# Patient Record
Sex: FEMALE | Race: WHITE | NOT HISPANIC OR LATINO | Employment: OTHER | URBAN - METROPOLITAN AREA
[De-identification: names, ages, dates, MRNs, and addresses within clinical notes are randomized per-mention and may not be internally consistent; named-entity substitution may affect disease eponyms.]

---

## 2018-05-03 ENCOUNTER — OFFICE VISIT (OUTPATIENT)
Dept: CARDIOLOGY CLINIC | Facility: CLINIC | Age: 73
End: 2018-05-03
Payer: MEDICARE

## 2018-05-03 VITALS
OXYGEN SATURATION: 97 % | SYSTOLIC BLOOD PRESSURE: 124 MMHG | WEIGHT: 188.4 LBS | HEIGHT: 66 IN | BODY MASS INDEX: 30.28 KG/M2 | DIASTOLIC BLOOD PRESSURE: 80 MMHG | HEART RATE: 76 BPM

## 2018-05-03 DIAGNOSIS — E78.2 MIXED HYPERLIPIDEMIA: ICD-10-CM

## 2018-05-03 DIAGNOSIS — I35.0 NONRHEUMATIC AORTIC VALVE STENOSIS: Primary | ICD-10-CM

## 2018-05-03 DIAGNOSIS — I10 ESSENTIAL HYPERTENSION: ICD-10-CM

## 2018-05-03 PROCEDURE — 99214 OFFICE O/P EST MOD 30 MIN: CPT | Performed by: INTERNAL MEDICINE

## 2018-05-03 RX ORDER — LEVOTHYROXINE SODIUM 125 UG/1
125 TABLET ORAL DAILY
COMMUNITY
Start: 2018-04-03

## 2018-05-03 RX ORDER — POTASSIUM CHLORIDE 750 MG/1
10 TABLET, FILM COATED, EXTENDED RELEASE ORAL DAILY
COMMUNITY
Start: 2018-04-03

## 2018-05-03 RX ORDER — HYDROCHLOROTHIAZIDE 25 MG/1
25 TABLET ORAL DAILY
COMMUNITY
Start: 2018-04-03 | End: 2020-03-17 | Stop reason: ALTCHOICE

## 2018-05-03 RX ORDER — ATORVASTATIN CALCIUM 40 MG/1
40 TABLET, FILM COATED ORAL DAILY
COMMUNITY
Start: 2018-04-03 | End: 2019-03-26 | Stop reason: DRUGHIGH

## 2018-05-03 NOTE — PROGRESS NOTES
Cardiology Follow Up    [facility]  1945  [id number]  [address] [facility]  [address] [place] [address] [address]    1  Nonrheumatic aortic valve stenosis     2  Mixed hyperlipidemia     3  Essential hypertension         Interval History:  53-year-old female with a known history of aortic stenosis  She states she is asymptomatic in tolerates all activity without limitation  She states she was able to walk multiple flight of steps without stopping she is able walk up up grades she takes care of her grandchildren and again she does says she does not get short of breath  She also denies chest pain orthopnea paroxysmal nocturnal dyspnea or syncope  Patient Active Problem List   Diagnosis    Nonrheumatic aortic valve stenosis    Mixed hyperlipidemia    Essential hypertension     Past Medical History:   Diagnosis Date    Hypothyroid      Social History     Social History    Marital status: /Civil Union     Spouse name: N/A    Number of children: N/A    Years of education: N/A     Occupational History    Not on file  Social History Main Topics    Smoking status: Former Smoker     Quit date: 2003    Smokeless tobacco: Former User    Alcohol use No    Drug use: No    Sexual activity: Not on file     Other Topics Concern    Not on file     Social History Narrative    No narrative on file      History reviewed  No pertinent family history  History reviewed  No pertinent surgical history  Current Outpatient Prescriptions:     atorvastatin (LIPITOR) 40 mg tablet, 40 mg daily  , Disp: , Rfl:     hydrochlorothiazide (HYDRODIURIL) 25 mg tablet, 25 mg daily  , Disp: , Rfl:     potassium chloride (K-DUR) 10 mEq tablet, 10 mEq daily  , Disp: , Rfl:     SYNTHROID 125 MCG tablet, 125 mcg daily  , Disp: , Rfl:   No Known Allergies    Labs:  No results found for any previous visit       Imaging: No results found  Review of Systems:  Review of Systems   Constitutional: Negative for fatigue  HENT: Negative for hearing loss  Eyes: Negative for discharge  Respiratory: Negative for shortness of breath  Cardiovascular: Negative for chest pain, palpitations and leg swelling  Gastrointestinal: Negative for abdominal pain  Endocrine: Negative for polyuria  Genitourinary: Negative for dysuria  Musculoskeletal: Negative for back pain and myalgias  Skin: Negative for rash  Neurological: Negative for syncope  Hematological: Does not bruise/bleed easily  Psychiatric/Behavioral: Negative for confusion and sleep disturbance  Physical Exam:  Physical Exam   Constitutional: She is oriented to person, place, and time  She appears well-developed and well-nourished  HENT:   Head: Normocephalic and atraumatic  Mouth/Throat: Oropharynx is clear and moist    Eyes: EOM are normal    Neck: Normal range of motion  Neck supple  No JVD present  Cardiovascular: Normal rate, regular rhythm and intact distal pulses  Exam reveals gallop and S4  Murmur heard  Systolic murmur is present with a grade of 3/6   Pulmonary/Chest: Effort normal and breath sounds normal    Abdominal: Soft  Bowel sounds are normal    Musculoskeletal: Normal range of motion  Neurological: She is alert and oriented to person, place, and time  She has normal reflexes  Skin: Skin is warm and dry  Psychiatric: She has a normal mood and affect  Her behavior is normal  Judgment and thought content normal    Nursing note and vitals reviewed  Discussion/Summary:  Amita Young is a history of aortic stenosis  She has a cardiologist in the [place] where she stays during the summer to watch her grandchildren  She lives in the [facility] in the winter  On 04/03/2018 she saw Dr Rocio Cotton in    At that time an echocardiogram was performed that showed moderate concentric left ventricular hypertrophy with critical aortic stenosis  Peak aortic valve velocity was 5 2 m/sec with a peak aortic gradient of 120 mm of mercury and a mean gradient of 80  This is consistent with an echo she had a few years back in [place]  I think with a velocity over 5 in the development of moderate left ventricular hypertrophy would recommend aortic valve replacement at this point even that she claims to be asymptomatic  Her calculated valve area 0 65 centimeter squared  I suspect if we did functional assessment she would have some moderate impairment in functional capacity  She asks if she is a candidate for tavr  From a surgical risk standpoint she is likely low to at most moderate  If she meets moderate criteria she would be a low to intermediate risk surgical candidate  Her preference is to have a tavr  I offered her an assessment at [facility] so that we can better assess her risk and also do some other testing for final recommendation regarding tab are versus surgical aortic valve replacement  She would like to get the procedure done in [place]  She states that she is going to follow up with a cardiologist in [place] when she is there this summer  If she decides not to use that system she will  notify us  Otherwise I will see her on an as needed basis

## 2018-08-03 DIAGNOSIS — J90 PLEURAL EFFUSION: Primary | ICD-10-CM

## 2018-09-20 ENCOUNTER — OFFICE VISIT (OUTPATIENT)
Dept: CARDIOLOGY CLINIC | Facility: CLINIC | Age: 73
End: 2018-09-20
Payer: MEDICARE

## 2018-09-20 VITALS
HEART RATE: 64 BPM | SYSTOLIC BLOOD PRESSURE: 140 MMHG | BODY MASS INDEX: 29.66 KG/M2 | HEIGHT: 65 IN | WEIGHT: 178 LBS | DIASTOLIC BLOOD PRESSURE: 85 MMHG

## 2018-09-20 DIAGNOSIS — I10 HTN (HYPERTENSION), BENIGN: Primary | ICD-10-CM

## 2018-09-20 DIAGNOSIS — E78.2 MIXED HYPERLIPIDEMIA: ICD-10-CM

## 2018-09-20 DIAGNOSIS — I10 ESSENTIAL HYPERTENSION: ICD-10-CM

## 2018-09-20 DIAGNOSIS — Z95.3 S/P AORTIC VALVE REPLACEMENT WITH PORCINE VALVE: ICD-10-CM

## 2018-09-20 PROBLEM — I35.0 NONRHEUMATIC AORTIC VALVE STENOSIS: Status: RESOLVED | Noted: 2018-05-03 | Resolved: 2018-09-20

## 2018-09-20 PROCEDURE — 99214 OFFICE O/P EST MOD 30 MIN: CPT | Performed by: INTERNAL MEDICINE

## 2018-09-20 RX ORDER — AMLODIPINE BESYLATE 5 MG/1
5 TABLET ORAL DAILY
Qty: 90 TABLET | Refills: 5 | Status: SHIPPED | OUTPATIENT
Start: 2018-09-20 | End: 2019-11-05 | Stop reason: SDUPTHER

## 2018-09-20 RX ORDER — ASPIRIN 81 MG/1
81 TABLET ORAL DAILY
COMMUNITY
End: 2020-03-17 | Stop reason: ALTCHOICE

## 2018-09-20 RX ORDER — AMLODIPINE BESYLATE 5 MG/1
5 TABLET ORAL DAILY
COMMUNITY
Start: 2018-08-28 | End: 2018-09-20 | Stop reason: SDUPTHER

## 2018-09-20 RX ORDER — METOPROLOL SUCCINATE 25 MG/1
25 TABLET, EXTENDED RELEASE ORAL DAILY
Qty: 90 TABLET | Refills: 5 | Status: SHIPPED | OUTPATIENT
Start: 2018-09-20 | End: 2019-11-05 | Stop reason: SDUPTHER

## 2018-09-20 RX ORDER — METOPROLOL SUCCINATE 25 MG/1
25 TABLET, EXTENDED RELEASE ORAL DAILY
COMMUNITY
Start: 2018-08-28 | End: 2018-09-20 | Stop reason: SDUPTHER

## 2018-09-20 NOTE — ASSESSMENT & PLAN NOTE
Discussed possibly discontinuing statin, as catheterization did not show blockages  Patient will discuss with PCP

## 2018-09-20 NOTE — PROGRESS NOTES
Patient ID: Marny Closs is a 72 y o  female  Plan:      Essential hypertension  BP adequately controlled today and by patient's home logs  Mixed hyperlipidemia  Discussed possibly discontinuing statin, as catheterization did not show blockages  Patient will discuss with PCP  S/P aortic valve replacement with porcine valve  Sounds good on exam  Will check baseline ECHO at next follow up visit in 6 months  Follow up Plan: Will check baseline ECHO at next follow-up visit in 6 months  No other changes  HPI: The patient presents to the office for routine followup regarding HTN and her aortic valve stenosis  Since we last saw her, she had a cardiac catheterization and porcine aortic valve replacement  This was performed with a mini thoracotomy at [facility]  No coronary disease was found  She currently offers no cardiac complaints  She denies chest pain, SOB, orthopnea, palpitations, syncope  Past Surgical History:   Procedure Laterality Date    AORTIC VALVE REPLACEMENT  06/29/2018    #23 Medtronic Mosaic porcine valve aortic valve at [address]  06/29/2018    CARDIAC CATHETERIZATION  06/28/2018    No significant CAD  Calcified aortic valve  CMP: No results found for: NA, K, CL, CO2, BUN, CREATININE, GLUCOSE, EGFR    Lipid Profile: No results found for: CHOL, TRIG, HDL, LDL      Review of Systems   10  point ROS  was otherwise non pertinent or negative except as per HPI or as below  Gait: Normal        Objective:     /85 (BP Location: Right arm, Patient Position: Sitting, Cuff Size: Adult)   Pulse 64   Ht 5' 5" (1 651 m)   Wt 80 7 kg (178 lb)   BMI 29 62 kg/m²     PHYSICAL EXAM:    General:  Normal appearance in no distress  Eyes:  Anicteric  Oral mucosa:  Moist   Neck:  No JVD  Carotid upstrokes are brisk without bruits  No masses  Chest:  Clear to auscultation and percussion    Right mini thoracotomy chest wound appears well healed  Cardiac:  Normal PMI  Normal S1 and S2  No murmur gallop or rub  Abdomen:  Soft and nontender  No palpable organomegaly or aortic enlargement  Extremities:  No peripheral edema  Musculoskeletal:  Symmetric  Vascular:  Femoral pulses are brisk without bruits  Popliteal pulses are intact bilaterally  Pedal pulses are intact  Neuro:  Grossly symmetric  Psych:  Alert and oriented x3  Current Outpatient Prescriptions:     amLODIPine (NORVASC) 5 mg tablet, Take 1 tablet (5 mg total) by mouth daily, Disp: 90 tablet, Rfl: 5    aspirin (ECOTRIN LOW STRENGTH) 81 mg EC tablet, Take 81 mg by mouth daily, Disp: , Rfl:     atorvastatin (LIPITOR) 40 mg tablet, 40 mg daily  , Disp: , Rfl:     hydrochlorothiazide (HYDRODIURIL) 25 mg tablet, 25 mg daily  , Disp: , Rfl:     metoprolol succinate (TOPROL-XL) 25 mg 24 hr tablet, Take 1 tablet (25 mg total) by mouth daily, Disp: 90 tablet, Rfl: 5    potassium chloride (K-DUR) 10 mEq tablet, 10 mEq daily  , Disp: , Rfl:     SYNTHROID 125 MCG tablet, 125 mcg daily  , Disp: , Rfl:   No Known Allergies  Past Medical History:   Diagnosis Date    History of echocardiogram 04/02/2018    EF 0 65 (65%), moderate-severe aortic stenosis  Trace-mild mitral regurgitation      Hyperlipidemia     Hypertension     Hypothyroid     Nonrheumatic aortic (valve) stenosis     s/p AVR on 6/29/2018           History   Smoking Status    Former Smoker    Types: Cigarettes    Quit date: 2003   Smokeless Tobacco    Former User

## 2019-01-08 ENCOUNTER — OFFICE VISIT (OUTPATIENT)
Dept: URGENT CARE | Facility: CLINIC | Age: 74
End: 2019-01-08
Payer: MEDICARE

## 2019-01-08 VITALS
OXYGEN SATURATION: 99 % | BODY MASS INDEX: 29.66 KG/M2 | HEIGHT: 65 IN | DIASTOLIC BLOOD PRESSURE: 68 MMHG | HEART RATE: 78 BPM | TEMPERATURE: 98.3 F | WEIGHT: 178 LBS | SYSTOLIC BLOOD PRESSURE: 120 MMHG | RESPIRATION RATE: 20 BRPM

## 2019-01-08 DIAGNOSIS — B96.89 ACUTE BACTERIAL BRONCHITIS: Primary | ICD-10-CM

## 2019-01-08 DIAGNOSIS — J20.8 ACUTE BACTERIAL BRONCHITIS: Primary | ICD-10-CM

## 2019-01-08 PROCEDURE — 99203 OFFICE O/P NEW LOW 30 MIN: CPT | Performed by: PHYSICIAN ASSISTANT

## 2019-01-08 PROCEDURE — G0463 HOSPITAL OUTPT CLINIC VISIT: HCPCS | Performed by: PHYSICIAN ASSISTANT

## 2019-01-08 RX ORDER — PREDNISONE 10 MG/1
TABLET ORAL
Qty: 26 TABLET | Refills: 0 | Status: SHIPPED | OUTPATIENT
Start: 2019-01-08 | End: 2020-03-17 | Stop reason: ALTCHOICE

## 2019-01-08 RX ORDER — CEFUROXIME AXETIL 500 MG/1
500 TABLET ORAL EVERY 12 HOURS SCHEDULED
Qty: 20 TABLET | Refills: 0 | Status: SHIPPED | OUTPATIENT
Start: 2019-01-08 | End: 2019-01-18

## 2019-01-08 NOTE — PROGRESS NOTES
[facility]    NAME: Norris Peres is a 72 y o  female  : 1945    MRN: [id number]  DATE: 2019  TIME: 1:00 PM    Assessment and Plan   Acute bacterial bronchitis [J20 8, B96 89]  1  Acute bacterial bronchitis  cefuroxime (CEFTIN) 500 mg tablet    predniSONE 10 mg tablet       Patient Instructions     Patient Instructions   I have prescribed an antibiotic for the infection  Please take the antibiotic as prescribed and finish the entire prescription  I recommend that the patient takes an over the counter probiotic or eats yogurt with live cultures in it [place]) to keep good bacteria in the gut and help prevent diarrhea  Wash hands frequently to prevent the spread of infection  Can use over the counter cough and cold medications to help with symptoms  Ibuprofen and/or tylenol as needed for pain or fever  If not improving over the next 7-10 days, follow up with PCP  Chief Complaint     Chief Complaint   Patient presents with    Cough     cough and sinus pressure for 1 week       History of Present Illness   81-year-old female here with complaint worsening cough for the last week  Cough was very productive in the beginning, now chest is very tight  Denies any fever or chills  Has had wheezing from time to time  Some nasal congestion  Review of Systems   Review of Systems   Constitutional: Positive for fatigue  Negative for activity change, appetite change, chills, diaphoresis, fever and unexpected weight change  HENT: Positive for congestion and sore throat  Negative for dental problem, hearing loss, sinus pressure, sneezing, tinnitus, trouble swallowing and voice change  Eyes: Negative for photophobia, redness and visual disturbance  Respiratory: Positive for cough, chest tightness and wheezing  Negative for apnea, shortness of breath and stridor  Cardiovascular: Negative for chest pain, palpitations and leg swelling     Gastrointestinal: Negative for abdominal distention, abdominal pain, blood in stool, constipation, diarrhea, nausea and vomiting  Endocrine: Negative for cold intolerance, heat intolerance, polydipsia, polyphagia and polyuria  Genitourinary: Negative for difficulty urinating, dysuria, flank pain, frequency, hematuria and urgency  Musculoskeletal: Negative for arthralgias, back pain, gait problem, joint swelling, myalgias, neck pain and neck stiffness  Skin: Negative for pallor, rash and wound  Neurological: Negative for dizziness, tremors, seizures, speech difficulty, weakness and headaches  Hematological: Negative for adenopathy  Does not bruise/bleed easily  Psychiatric/Behavioral: Negative for agitation, confusion, dysphoric mood and sleep disturbance  The patient is not nervous/anxious  All other systems reviewed and are negative        Current Medications     Current Outpatient Prescriptions:     amLODIPine (NORVASC) 5 mg tablet, Take 1 tablet (5 mg total) by mouth daily, Disp: 90 tablet, Rfl: 5    aspirin (ECOTRIN LOW STRENGTH) 81 mg EC tablet, Take 81 mg by mouth daily, Disp: , Rfl:     atorvastatin (LIPITOR) 40 mg tablet, 40 mg daily  , Disp: , Rfl:     metoprolol succinate (TOPROL-XL) 25 mg 24 hr tablet, Take 1 tablet (25 mg total) by mouth daily, Disp: 90 tablet, Rfl: 5    potassium chloride (K-DUR) 10 mEq tablet, 10 mEq daily  , Disp: , Rfl:     SYNTHROID 125 MCG tablet, 125 mcg daily  , Disp: , Rfl:     cefuroxime (CEFTIN) 500 mg tablet, Take 1 tablet (500 mg total) by mouth every 12 (twelve) hours for 10 days, Disp: 20 tablet, Rfl: 0    hydrochlorothiazide (HYDRODIURIL) 25 mg tablet, 25 mg daily  , Disp: , Rfl:     predniSONE 10 mg tablet, Take 3 tabs BID X 2 days, 2 tabs BID X 2 days, 1 tab BID X 2 days, 1 tab daily X 2 days, Disp: 26 tablet, Rfl: 0    Current Allergies     Allergies as of 01/08/2019    (No Known Allergies)          The following portions of the patient's history were reviewed and updated as appropriate: allergies, current medications, past family history, past medical history, past social history, past surgical history and problem list    Past Medical History:   Diagnosis Date    History of echocardiogram 04/02/2018    EF 0 65 (65%), moderate-severe aortic stenosis  Trace-mild mitral regurgitation   Hyperlipidemia     Hypertension     Hypothyroid     Nonrheumatic aortic (valve) stenosis     s/p AVR on 6/29/2018     Past Surgical History:   Procedure Laterality Date    AORTIC VALVE REPLACEMENT  06/29/2018    #23 Medtronic Mosaic porcine valve aortic valve at [address]  06/29/2018    CARDIAC CATHETERIZATION  06/28/2018    No significant CAD  Calcified aortic valve  Family History   Problem Relation Age of Onset    Cancer Mother     Diabetes type II Brother      Social History     Social History    Marital status: /Civil Union     Spouse name: N/A    Number of children: N/A    Years of education: N/A     Occupational History    Not on file  Social History Main Topics    Smoking status: Former Smoker     Types: Cigarettes     Quit date: 2003    Smokeless tobacco: Former User    Alcohol use No    Drug use: No    Sexual activity: Not on file     Other Topics Concern    Not on file     Social History Narrative    No narrative on file     Medications have been verified  Objective   /68   Pulse 78   Temp 98 3 °F (36 8 °C) (Tympanic)   Resp 20   Ht 5' 5" (1 651 m)   Wt 80 7 kg (178 lb)   SpO2 99%   BMI 29 62 kg/m²      Physical Exam   Physical Exam   Constitutional: She appears well-developed and well-nourished  No distress  HENT:   Head: Normocephalic  Right Ear: Tympanic membrane and external ear normal    Left Ear: Tympanic membrane and external ear normal    Nose: Mucosal edema present  Mouth/Throat: Posterior oropharyngeal erythema present  No oropharyngeal exudate  Neck: Normal range of motion  Neck supple  Cardiovascular: Normal rate, regular rhythm and normal heart sounds  No murmur heard  Pulmonary/Chest: Effort normal  No respiratory distress  She has wheezes  She has no rhonchi  She has no rales  Abdominal: Soft  Bowel sounds are normal  There is no tenderness  Musculoskeletal: Normal range of motion  Lymphadenopathy:     She has no cervical adenopathy  Skin: Skin is warm  No rash noted  Nursing note and vitals reviewed

## 2019-02-15 DIAGNOSIS — Z95.3 S/P AORTIC VALVE REPLACEMENT WITH PORCINE VALVE: Primary | ICD-10-CM

## 2019-02-15 DIAGNOSIS — I35.0 NONRHEUMATIC AORTIC VALVE STENOSIS: ICD-10-CM

## 2019-03-20 ENCOUNTER — HOSPITAL ENCOUNTER (OUTPATIENT)
Dept: NON INVASIVE DIAGNOSTICS | Facility: CLINIC | Age: 74
Discharge: HOME | End: 2019-03-20
Payer: MEDICARE

## 2019-03-20 DIAGNOSIS — Z95.3 S/P AORTIC VALVE REPLACEMENT WITH PORCINE VALVE: ICD-10-CM

## 2019-03-20 PROCEDURE — 93306 TTE W/DOPPLER COMPLETE: CPT | Performed by: INTERNAL MEDICINE

## 2019-03-20 PROCEDURE — 93306 TTE W/DOPPLER COMPLETE: CPT

## 2019-03-26 ENCOUNTER — OFFICE VISIT (OUTPATIENT)
Dept: CARDIOLOGY CLINIC | Facility: CLINIC | Age: 74
End: 2019-03-26
Payer: MEDICARE

## 2019-03-26 VITALS
SYSTOLIC BLOOD PRESSURE: 140 MMHG | DIASTOLIC BLOOD PRESSURE: 76 MMHG | HEART RATE: 60 BPM | HEIGHT: 65 IN | WEIGHT: 193 LBS | BODY MASS INDEX: 32.15 KG/M2

## 2019-03-26 DIAGNOSIS — Z95.3 S/P AORTIC VALVE REPLACEMENT WITH PORCINE VALVE: ICD-10-CM

## 2019-03-26 DIAGNOSIS — I10 ESSENTIAL HYPERTENSION: ICD-10-CM

## 2019-03-26 DIAGNOSIS — E78.2 MIXED HYPERLIPIDEMIA: Primary | ICD-10-CM

## 2019-03-26 PROCEDURE — 99214 OFFICE O/P EST MOD 30 MIN: CPT | Performed by: INTERNAL MEDICINE

## 2019-03-26 NOTE — PROGRESS NOTES
Patient ID: Shannan Rivera is a 69 y o  female  Plan:      S/P aortic valve replacement with porcine valve  The prosthesis sounds great  Looked great by echo  Mixed hyperlipidemia  Given absence of overt vascular disease, statin can be stopped  Essential hypertension  Well controlled  Follow up Plan: 1 year ecg and f/u      HPI: The patient is seen in f/u regarding prior A VR  No recent chest pain or dyspnea  No syncope or near syncope  No palpitations  She continues to have to suffer with her  aggravating her on an every 30 second basis all:)      Most recent or relevant cardiac/vascular testing:    Echocardiogram 3/20/2019:  Normal LV function  Normal functioning aortic prosthesis with peak velocity across that prosthesis of 2 7 m/sec  Past Surgical History:   Procedure Laterality Date    AORTIC VALVE REPLACEMENT  06/29/2018    #23 Medtronic Mosaic porcine valve aortic valve at [address] [employer]  06/29/2018    CARDIAC CATHETERIZATION  06/28/2018    No significant CAD  Calcified aortic valve  CMP: No results found for: NA, K, CL, CO2, BUN, CREATININE, GLUCOSE, EGFR    Lipid Profile: No results found for: CHOL, TRIG, HDL, LDL      Review of Systems   10  point ROS  was otherwise non pertinent or negative except as per HPI or as below  Gait:  Normal         Objective:     /76   Pulse 60   Ht 5' 5" (1 651 m)   Wt 87 5 kg (193 lb)   BMI 32 12 kg/m²     PHYSICAL EXAM:    General:  Normal appearance in no distress  Eyes:  Anicteric  Oral mucosa:  Moist   Neck:  No JVD  Carotid upstrokes are brisk without bruits  No masses  Chest:  Clear to auscultation and percussion  Cardiac:  Normal PMI  Normal S1 and S2  No murmur gallop or rub  Abdomen:  Soft and nontender  No palpable organomegaly or aortic enlargement  Extremities:  No peripheral edema  Musculoskeletal:  Symmetric     Vascular:  Femoral pulses are brisk without bruits  Popliteal pulses are intact bilaterally  Pedal pulses are intact  Neuro:  Grossly symmetric  Psych:  Alert and oriented x3  Current Outpatient Medications:     amLODIPine (NORVASC) 5 mg tablet, Take 1 tablet (5 mg total) by mouth daily, Disp: 90 tablet, Rfl: 5    aspirin (ECOTRIN LOW STRENGTH) 81 mg EC tablet, Take 81 mg by mouth daily, Disp: , Rfl:     hydrochlorothiazide (HYDRODIURIL) 25 mg tablet, 25 mg daily  , Disp: , Rfl:     metoprolol succinate (TOPROL-XL) 25 mg 24 hr tablet, Take 1 tablet (25 mg total) by mouth daily, Disp: 90 tablet, Rfl: 5    potassium chloride (K-DUR) 10 mEq tablet, 10 mEq daily  , Disp: , Rfl:     SYNTHROID 125 MCG tablet, 125 mcg daily  , Disp: , Rfl:     predniSONE 10 mg tablet, Take 3 tabs BID X 2 days, 2 tabs BID X 2 days, 1 tab BID X 2 days, 1 tab daily X 2 days (Patient not taking: Reported on 3/26/2019), Disp: 26 tablet, Rfl: 0  No Known Allergies  Past Medical History:   Diagnosis Date    History of echocardiogram 2018    EF 0 65 (65%), moderate-severe aortic stenosis  Trace-mild mitral regurgitation      Hyperlipidemia     Hypertension     Hypothyroid     Nonrheumatic aortic (valve) stenosis     s/p AVR on 2018           Social History     Tobacco Use   Smoking Status Former Smoker    Types: Cigarettes    Last attempt to quit: 2003    Years since quittin 2   Smokeless Tobacco Former User

## 2019-04-23 DIAGNOSIS — E78.2 MIXED HYPERLIPIDEMIA: Primary | ICD-10-CM

## 2019-04-23 RX ORDER — ATORVASTATIN CALCIUM 40 MG/1
TABLET, FILM COATED ORAL
Qty: 90 TABLET | Refills: 5 | Status: SHIPPED | OUTPATIENT
Start: 2019-04-23 | End: 2019-09-27 | Stop reason: SDUPTHER

## 2019-09-27 DIAGNOSIS — E78.2 MIXED HYPERLIPIDEMIA: ICD-10-CM

## 2019-09-27 RX ORDER — ATORVASTATIN CALCIUM 40 MG/1
40 TABLET, FILM COATED ORAL DAILY
Qty: 90 TABLET | Refills: 3 | Status: SHIPPED | OUTPATIENT
Start: 2019-09-27 | End: 2020-09-14

## 2019-10-21 LAB — HBA1C MFR BLD HPLC: 5.3 %

## 2019-11-05 DIAGNOSIS — I10 HTN (HYPERTENSION), BENIGN: ICD-10-CM

## 2019-11-05 RX ORDER — METOPROLOL SUCCINATE 25 MG/1
TABLET, EXTENDED RELEASE ORAL
Qty: 90 TABLET | Refills: 5 | Status: SHIPPED | OUTPATIENT
Start: 2019-11-05 | End: 2020-12-20

## 2019-11-05 RX ORDER — AMLODIPINE BESYLATE 5 MG/1
TABLET ORAL
Qty: 90 TABLET | Refills: 5 | Status: SHIPPED | OUTPATIENT
Start: 2019-11-05 | End: 2020-12-20

## 2020-03-17 ENCOUNTER — OFFICE VISIT (OUTPATIENT)
Dept: CARDIOLOGY CLINIC | Facility: CLINIC | Age: 75
End: 2020-03-17
Payer: MEDICARE

## 2020-03-17 VITALS
SYSTOLIC BLOOD PRESSURE: 120 MMHG | HEART RATE: 60 BPM | BODY MASS INDEX: 32.99 KG/M2 | HEIGHT: 65 IN | WEIGHT: 198 LBS | DIASTOLIC BLOOD PRESSURE: 80 MMHG

## 2020-03-17 DIAGNOSIS — Z95.3 S/P AORTIC VALVE REPLACEMENT WITH PORCINE VALVE: Primary | ICD-10-CM

## 2020-03-17 DIAGNOSIS — I35.0 NONRHEUMATIC AORTIC VALVE STENOSIS: ICD-10-CM

## 2020-03-17 DIAGNOSIS — E78.2 MIXED HYPERLIPIDEMIA: ICD-10-CM

## 2020-03-17 DIAGNOSIS — I10 ESSENTIAL HYPERTENSION: ICD-10-CM

## 2020-03-17 PROCEDURE — 99214 OFFICE O/P EST MOD 30 MIN: CPT | Performed by: PHYSICIAN ASSISTANT

## 2020-03-17 PROCEDURE — 93000 ELECTROCARDIOGRAM COMPLETE: CPT | Performed by: PHYSICIAN ASSISTANT

## 2020-03-17 NOTE — PROGRESS NOTES
[facility]     Job Baer / 70 y o  female / : 1945 / MRN: [id number]  Date of Visit: 20    ASSESSMENT/PLAN     S/P aortic valve replacement with porcine valve  · S/p #23 Medtronic Mosaic bioAVR 2018   · Normal function by echo last march and by exam today   · Repeat echo just prior to visit next year    Mixed hyperlipidemia  · No significant CAD on cath at time of AVR, but she reports that lipids are elevated when checked by her PCP - she remains on atorvastatin      Essential hypertension  Controlled on Toprol and amlodipine      EKG and f/u in 1 year with echo just prior     SUBJECTIVE:  HPI: Job Baer is a 70 y o  female who presents for follow-up regarding AS s/p AVR, HTN and HLD  She has no complaints today  She is almost 2 years post aortic valve replacement  No recent chest pain/pressure, dyspnea, near-syncope or syncope  Cardiac testing:   Echocardiogram 3/20/2019:  Normal LV function  Normal functioning aortic prosthesis with peak velocity across that prosthesis of 2 7 m/sec  No Known Allergies      Current Outpatient Medications:     amLODIPine (NORVASC) 5 mg tablet, TAKE 1 TABLET BY MOUTH  DAILY, Disp: 90 tablet, Rfl: 5    atorvastatin (LIPITOR) 40 mg tablet, Take 1 tablet (40 mg total) by mouth daily, Disp: 90 tablet, Rfl: 3    metoprolol succinate (TOPROL-XL) 25 mg 24 hr tablet, TAKE 1 TABLET BY MOUTH  DAILY, Disp: 90 tablet, Rfl: 5    potassium chloride (K-DUR) 10 mEq tablet, Take 10 mEq by mouth daily , Disp: , Rfl:     SYNTHROID 125 MCG tablet, 125 mcg daily  , Disp: , Rfl:     Past Medical History:   Diagnosis Date    History of echocardiogram 2018    EF 0 65 (65%), moderate-severe aortic stenosis  Trace-mild mitral regurgitation      Hyperlipidemia     Hypertension     Hypothyroid     Nonrheumatic aortic (valve) stenosis     s/p AVR on 2018       Family History   Problem Relation Age of Onset    Cancer Mother  Diabetes type II Brother        Past Surgical History:   Procedure Laterality Date    AORTIC VALVE REPLACEMENT  2018    #23 Medtronic Mosaic porcine valve aortic valve at [address]  2018    CARDIAC CATHETERIZATION  2018    No significant CAD  Calcified aortic valve  Social History     Socioeconomic History    Marital status: /Civil Union     Spouse name: Not on file    Number of children: Not on file    Years of education: Not on file    Highest education level: Not on file   Occupational History    Not on file   Social Needs    Financial resource strain: Not on file    Food insecurity:     Worry: Not on file     Inability: Not on file    Transportation needs:     Medical: Not on file     Non-medical: Not on file   Tobacco Use    Smoking status: Former Smoker     Types: Cigarettes     Last attempt to quit: 2003     Years since quittin 2    Smokeless tobacco: Former User   Substance and Sexual Activity    Alcohol use: No    Drug use: No    Sexual activity: Not on file   Lifestyle    Physical activity:     Days per week: Not on file     Minutes per session: Not on file    Stress: Not on file   Relationships    Social connections:     Talks on phone: Not on file     Gets together: Not on file     Attends Anabaptism service: Not on file     Active member of club or organization: Not on file     Attends meetings of clubs or organizations: Not on file     Relationship status: Not on file    Intimate partner violence:     Fear of current or ex partner: Not on file     Emotionally abused: Not on file     Physically abused: Not on file     Forced sexual activity: Not on file   Other Topics Concern    Not on file   Social History Narrative    Not on file       Review of Systems   Constitution: Negative  HENT: Negative  Eyes: Negative      Cardiovascular: Negative for chest pain, claudication, dyspnea on exertion, irregular heartbeat, leg swelling, near-syncope, orthopnea, palpitations, paroxysmal nocturnal dyspnea and syncope  Respiratory: Negative for cough, shortness of breath and wheezing  Endocrine: Negative  Skin: Negative  Musculoskeletal: Negative  Gastrointestinal: Negative  Genitourinary: Negative  Neurological: Negative for dizziness and light-headedness  Psychiatric/Behavioral: Negative  OBJECTIVE:  Vitals: /80   Pulse 60   Ht 5' 5" (1 651 m)   Wt 89 8 kg (198 lb)   BMI 32 95 kg/m²     Physical exam:   Physical Exam   Constitutional: She is oriented to person, place, and time  She appears well-developed and well-nourished  No distress  HENT:   Head: Normocephalic and atraumatic  Eyes: EOM are normal  No scleral icterus  Neck: Normal range of motion  Neck supple  Cardiovascular: Normal rate, regular rhythm, S1 normal and S2 normal    Murmur (grade 1-2 systolic murmur at the base) heard  Pulmonary/Chest: Effort normal and breath sounds normal  She has no wheezes  She has no rales  Abdominal: Soft  Bowel sounds are normal    Musculoskeletal: She exhibits no edema  Neurological: She is alert and oriented to person, place, and time  Skin: Skin is warm and dry  Psychiatric: She has a normal mood and affect  Her behavior is normal    Nursing note and vitals reviewed        Lab Results:   Lab Results   Component Value Date    HGBA1C 5 3 10/21/2019     No results found for: CHOL  No results found for: HDL  No results found for: LDLCALC  No results found for: TRIG  No results found for: CHOLHDL

## 2020-03-17 NOTE — ASSESSMENT & PLAN NOTE
· No significant CAD on cath at time of AVR, but she reports that lipids are elevated when checked by her PCP - she remains on atorvastatin

## 2020-03-17 NOTE — ASSESSMENT & PLAN NOTE
· S/p #23 Medtronic Mosaic bioAVR 6/2018   · Normal function by echo last march and by exam today   · Repeat echo just prior to visit next year

## 2020-09-14 DIAGNOSIS — E78.2 MIXED HYPERLIPIDEMIA: ICD-10-CM

## 2020-09-14 RX ORDER — ATORVASTATIN CALCIUM 40 MG/1
TABLET, FILM COATED ORAL
Qty: 90 TABLET | Refills: 3 | Status: SHIPPED | OUTPATIENT
Start: 2020-09-14 | End: 2021-01-19 | Stop reason: SDUPTHER

## 2020-12-19 DIAGNOSIS — I10 HTN (HYPERTENSION), BENIGN: ICD-10-CM

## 2020-12-20 RX ORDER — METOPROLOL SUCCINATE 25 MG/1
TABLET, EXTENDED RELEASE ORAL
Qty: 90 TABLET | Refills: 3 | Status: SHIPPED | OUTPATIENT
Start: 2020-12-20 | End: 2021-12-13

## 2020-12-20 RX ORDER — AMLODIPINE BESYLATE 5 MG/1
TABLET ORAL
Qty: 90 TABLET | Refills: 3 | Status: SHIPPED | OUTPATIENT
Start: 2020-12-20 | End: 2021-12-13

## 2021-01-19 DIAGNOSIS — E78.2 MIXED HYPERLIPIDEMIA: ICD-10-CM

## 2021-01-19 RX ORDER — ATORVASTATIN CALCIUM 40 MG/1
40 TABLET, FILM COATED ORAL DAILY
Qty: 90 TABLET | Refills: 3 | Status: SHIPPED | OUTPATIENT
Start: 2021-01-19

## 2021-02-11 DIAGNOSIS — Z95.3 S/P AORTIC VALVE REPLACEMENT WITH PORCINE VALVE: Primary | ICD-10-CM

## 2021-12-13 DIAGNOSIS — I10 HTN (HYPERTENSION), BENIGN: ICD-10-CM

## 2021-12-13 RX ORDER — METOPROLOL SUCCINATE 25 MG/1
TABLET, EXTENDED RELEASE ORAL
Qty: 90 TABLET | Refills: 3 | Status: SHIPPED | OUTPATIENT
Start: 2021-12-13 | End: 2022-01-12 | Stop reason: SDUPTHER

## 2021-12-13 RX ORDER — AMLODIPINE BESYLATE 5 MG/1
TABLET ORAL
Qty: 90 TABLET | Refills: 3 | Status: SHIPPED | OUTPATIENT
Start: 2021-12-13 | End: 2022-01-12 | Stop reason: SDUPTHER

## 2022-01-11 ENCOUNTER — OFFICE VISIT (OUTPATIENT)
Dept: CARDIOLOGY CLINIC | Facility: CLINIC | Age: 77
End: 2022-01-11
Payer: MEDICARE

## 2022-01-11 ENCOUNTER — HOSPITAL ENCOUNTER (OUTPATIENT)
Dept: NON INVASIVE DIAGNOSTICS | Facility: CLINIC | Age: 77
Discharge: HOME | End: 2022-01-11
Payer: MEDICARE

## 2022-01-11 VITALS — HEART RATE: 70 BPM | SYSTOLIC BLOOD PRESSURE: 130 MMHG | DIASTOLIC BLOOD PRESSURE: 70 MMHG

## 2022-01-11 VITALS
WEIGHT: 200 LBS | BODY MASS INDEX: 33.32 KG/M2 | HEART RATE: 54 BPM | DIASTOLIC BLOOD PRESSURE: 72 MMHG | SYSTOLIC BLOOD PRESSURE: 128 MMHG | HEIGHT: 65 IN

## 2022-01-11 DIAGNOSIS — Z95.3 S/P AORTIC VALVE REPLACEMENT WITH PORCINE VALVE: Primary | ICD-10-CM

## 2022-01-11 DIAGNOSIS — Z95.3 S/P AORTIC VALVE REPLACEMENT WITH PORCINE VALVE: ICD-10-CM

## 2022-01-11 DIAGNOSIS — E78.2 MIXED HYPERLIPIDEMIA: ICD-10-CM

## 2022-01-11 DIAGNOSIS — I10 ESSENTIAL HYPERTENSION: ICD-10-CM

## 2022-01-11 LAB
AORTIC ROOT: 3.5 CM
AORTIC VALVE MEAN VELOCITY: 25.7 M/S
APICAL FOUR CHAMBER EJECTION FRACTION: 61 %
AV AREA BY CONTINUOUS VTI: 1.2 CM2
AV AREA PEAK VELOCITY: 1.3 CM2
AV LVOT MEAN GRADIENT: 3 MMHG
AV LVOT PEAK GRADIENT: 6 MMHG
AV MEAN GRADIENT: 28 MMHG
AV PEAK GRADIENT: 42 MMHG
AV VALVE AREA: 1.22 CM2
AV VELOCITY RATIO: 0.36
DOP CALC AO PEAK VEL: 3.25 M/S
DOP CALC AO VTI: 93.04 CM
DOP CALC LVOT AREA: 3.46 CM2
DOP CALC LVOT DIAMETER: 2.1 CM
DOP CALC LVOT PEAK VEL VTI: 32.67 CM
DOP CALC LVOT PEAK VEL: 1.18 M/S
DOP CALC LVOT STROKE INDEX: 58.6 ML/M2
DOP CALC LVOT STROKE VOLUME: 113.1 CM3
DOP CALC MV VTI: 53.58 CM
DOP CALC RVOT PEAK VEL: 1.09 M/S
DOP CALC RVOT VTI: 29.63 CM
E WAVE DECELERATION TIME: 490 MS
FRACTIONAL SHORTENING: 44 % (ref 28–44)
INTERVENTRICULAR SEPTUM IN DIASTOLE (PARASTERNAL SHORT AXIS VIEW): 1.2 CM
LEFT ATRIUM AREA SYSTOLE SINGLE PLANE A4C: 17.3 CM2
LEFT ATRIUM SIZE: 4 CM
LEFT INTERNAL DIMENSION IN SYSTOLE: 2.4 CM (ref 2.1–4)
LEFT VENTRICULAR INTERNAL DIMENSION IN DIASTOLE: 4.3 CM (ref 5.16–7.68)
LEFT VENTRICULAR POSTERIOR WALL IN END DIASTOLE: 1.2 CM
LEFT VENTRICULAR STROKE VOLUME: 60 ML
MV E'TISSUE VEL-SEP: 5 CM/S
MV MEAN GRADIENT: 3 MMHG
MV PEAK A VEL: 1.52 M/S
MV PEAK E VEL: 114 CM/S
MV PEAK GRADIENT: 9 MMHG
MV STENOSIS PRESSURE HALF TIME: 0 MS
MV VALVE AREA BY CONTINUITY EQUATION: 2.11 CM2
PV MEAN GRADIENT: 3 MMHG
PV MEAN VELOCITY: 76 CM/S
PV PEAK GRADIENT: 4 MMHG
PV VTI: 28.27 CM
RIGHT ATRIUM AREA SYSTOLE A4C: 11.4 CM2
RIGHT VENTRICLE ID DIMENSION: 2 CM
SL CV LV EF: 60
SL CV PED ECHO LEFT VENTRICLE DIASTOLIC VOLUME (MOD BIPLANE) 2D: 81 ML
SL CV PED ECHO LEFT VENTRICLE SYSTOLIC VOLUME (MOD BIPLANE) 2D: 21 ML
SL CV RVOT MAX GRADIENT: 5 MMHG
SL CV RVOT MEAN GRADIENT: 3 MMHG
SL CV RVOT VMEAN: 0.82 M/S
Z-SCORE OF LEFT VENTRICULAR DIMENSION IN END SYSTOLE: -3.81

## 2022-01-11 PROCEDURE — 99214 OFFICE O/P EST MOD 30 MIN: CPT | Performed by: INTERNAL MEDICINE

## 2022-01-11 PROCEDURE — 93306 TTE W/DOPPLER COMPLETE: CPT

## 2022-01-11 PROCEDURE — 93000 ELECTROCARDIOGRAM COMPLETE: CPT | Performed by: INTERNAL MEDICINE

## 2022-01-11 NOTE — ASSESSMENT & PLAN NOTE
Valve sounds great  Last check by echo almost 3 years ago  Will reassess today by echo to be sure no higher velocity across the valve

## 2022-01-11 NOTE — PROGRESS NOTES
Patient ID: Kiah Young is a 78 y o  female  Plan:      S/P aortic valve replacement with porcine valve  Valve sounds great  Last check by echo almost 3 years ago  Will reassess today by echo to be sure no higher velocity across the valve  Essential hypertension  Well controlled  Mixed hyperlipidemia  Tolerating statin therapy  Follow up Plan/Other summary comments:  Assuming today's echo is okay, return visit and EKG in 1 year  HPI:  Patient is seen in follow-up today regarding the above issues  Since the last visit she has felt well  No chest pain or chest pressure  No syncope or near syncope  To reiterate, in June of 2018 patient had coronary angiography which revealed the absence of CAD but severe aortic stenosis  On 06/29/2018 she underwent 23 mm Medtronic aortic valve replacement at [facility] using a non midline sternotomy approach  EKG today:  sinus rhythm with leftward axis  First-degree AV block  Incomplete right bundle branch block pattern  Most recent or relevant cardiac/vascular testing:    Echocardiogram 3/20/2019: Normal LV function  Normal functioning aortic prosthesis with peak velocity across that prosthesis of 2 7 m/sec       Past Surgical History:   Procedure Laterality Date    AORTIC VALVE REPLACEMENT  06/29/2018    #23 Medtronic Mosaic porcine valve aortic valve at [address]  06/29/2018    CARDIAC CATHETERIZATION  06/28/2018    No significant CAD  Calcified aortic valve  CMP: No results found for: NA, K, CL, CO2, BUN, CREATININE, GLUCOSE, EGFR    Lipid Profile: No results found for: CHOL, TRIG, HDL, LDL      Review of Systems   10  point ROS  was otherwise non pertinent or negative except as per HPI or as below     Gait: Normal          Objective:     /72   Pulse (!) 54   Ht 5' 5" (1 651 m)   Wt 90 7 kg (200 lb)   BMI 33 28 kg/m²     PHYSICAL EXAM:    General:  Normal appearance in no distress  Eyes:  Anicteric  Oral mucosa:  Moist   Neck:  No JVD  Carotid upstrokes are brisk without bruits  No masses  Chest:  Clear to auscultation  Cardiac:  No palpable PMI  Normal S1 and S2  No murmur gallop or rub  Abdomen:  Soft and nontender  No palpable organomegaly or aortic enlargement  Extremities:  No peripheral edema  Musculoskeletal:  Symmetric  Vascular:  Femoral pulses are brisk without bruits  Popliteal pulses are intact bilaterally  Pedal pulses are intact  Neuro:  Grossly symmetric  Psych:  Alert and oriented x3  Current Outpatient Medications:     amLODIPine (NORVASC) 5 mg tablet, TAKE 1 TABLET BY MOUTH  DAILY, Disp: 90 tablet, Rfl: 3    atorvastatin (LIPITOR) 40 mg tablet, Take 1 tablet (40 mg total) by mouth daily, Disp: 90 tablet, Rfl: 3    metoprolol succinate (TOPROL-XL) 25 mg 24 hr tablet, TAKE 1 TABLET BY MOUTH  DAILY, Disp: 90 tablet, Rfl: 3    SYNTHROID 125 MCG tablet, 125 mcg daily  , Disp: , Rfl:     potassium chloride (K-DUR) 10 mEq tablet, Take 10 mEq by mouth daily  (Patient not taking: Reported on 2022 ), Disp: , Rfl:   No Known Allergies  Past Medical History:   Diagnosis Date    History of echocardiogram 2018    EF 0 65 (65%), moderate-severe aortic stenosis  Trace-mild mitral regurgitation      Hyperlipidemia     Hypertension     Hypothyroid     Nonrheumatic aortic (valve) stenosis     s/p AVR on 2018           Social History     Tobacco Use   Smoking Status Former Smoker    Types: Cigarettes    Quit date:     Years since quittin 0   Smokeless Tobacco Former User

## 2022-01-12 DIAGNOSIS — I10 HTN (HYPERTENSION), BENIGN: ICD-10-CM

## 2022-01-12 PROCEDURE — 93308 TTE F-UP OR LMTD: CPT | Performed by: INTERNAL MEDICINE

## 2022-01-12 RX ORDER — AMLODIPINE BESYLATE 5 MG/1
5 TABLET ORAL DAILY
Qty: 90 TABLET | Refills: 3 | Status: SHIPPED | OUTPATIENT
Start: 2022-01-12

## 2022-01-12 RX ORDER — METOPROLOL SUCCINATE 25 MG/1
25 TABLET, EXTENDED RELEASE ORAL DAILY
Qty: 90 TABLET | Refills: 3 | Status: SHIPPED | OUTPATIENT
Start: 2022-01-12

## 2023-01-08 DIAGNOSIS — I10 HTN (HYPERTENSION), BENIGN: ICD-10-CM

## 2023-01-09 RX ORDER — AMLODIPINE BESYLATE 5 MG/1
TABLET ORAL
Qty: 90 TABLET | Refills: 3 | Status: SHIPPED | OUTPATIENT
Start: 2023-01-09

## 2023-01-09 RX ORDER — METOPROLOL SUCCINATE 25 MG/1
TABLET, EXTENDED RELEASE ORAL
Qty: 90 TABLET | Refills: 3 | Status: SHIPPED | OUTPATIENT
Start: 2023-01-09

## 2023-02-07 ENCOUNTER — OFFICE VISIT (OUTPATIENT)
Dept: CARDIOLOGY CLINIC | Facility: CLINIC | Age: 78
End: 2023-02-07

## 2023-02-07 VITALS
HEART RATE: 53 BPM | BODY MASS INDEX: 32.1 KG/M2 | WEIGHT: 188 LBS | HEIGHT: 64 IN | SYSTOLIC BLOOD PRESSURE: 128 MMHG | DIASTOLIC BLOOD PRESSURE: 82 MMHG

## 2023-02-07 DIAGNOSIS — E78.2 MIXED HYPERLIPIDEMIA: ICD-10-CM

## 2023-02-07 DIAGNOSIS — I10 ESSENTIAL HYPERTENSION: ICD-10-CM

## 2023-02-07 DIAGNOSIS — Z95.3 S/P AORTIC VALVE REPLACEMENT WITH PORCINE VALVE: Primary | ICD-10-CM

## 2023-02-07 NOTE — PROGRESS NOTES
Patient ID: Maryl Cushing is a 78 y o  female  Plan:      Mixed hyperlipidemia  Given absence of CAD, I would stop atorvastatin but she will speak with her PCP about this  Essential hypertension  Well controlled  Given relative bradycardia, will stop the small dose of metoprolol  S/P aortic valve replacement with porcine valve  Last year velocity across the prosthesis was higher  Valve sounds ok but will recheck by echo  Follow up Plan/Other summary comments:  Return in about 1 year (around 2/7/2024)  HPI: Patient seen in f/u regarding the above issues  Patient is feeling well in general   No shortness of breath, chest pain, syncope or near syncope  To reiterate, in June of 2018 patient had coronary angiography which revealed the absence of CAD but severe aortic stenosis  On 06/29/2018 she underwent 23 mm Medtronic aortic valve replacement at [facility] using a non midline sternotomy approach  Results for orders placed or performed in visit on 02/07/23   POCT ECG    Impression    NSR  Left axis  Possible prior inferior wall MI     HR is 53  Most recent or relevant cardiac/vascular testing:    TTE 3/20/2019: Normal LV function  Normal functioning aortic prosthesis with peak velocity across that prosthesis of 2 7 m/sec     TTE 1/11/2022: As above except 3 2 m/sec valve flow  Past Surgical History:   Procedure Laterality Date   • AORTIC VALVE REPLACEMENT  06/29/2018    #23 Medtronic Mosaic porcine valve aortic valve at [facility]   • AORTIC VALVE REPLACEMENT  06/29/2018   • CARDIAC CATHETERIZATION  06/28/2018    No significant CAD  Calcified aortic valve  Lipid Profile: No results found for: CHOL, TRIG, HDL, LDL      Review of Systems   10  point ROS  was otherwise non pertinent or negative except as per HPI or as below     Gait: Normal          Objective:     /82   Pulse (!) 53   Ht 5' 4" (1 626 m)   Wt 85 3 kg (188 lb)   BMI 32 27 kg/m²     PHYSICAL EXAM:      General:  Normal appearance in no distress  Eyes:  Anicteric  Oral mucosa:  Moist   Neck:  No JVD  Carotid upstrokes are brisk without bruits  No masses  Chest:  Clear to auscultation  Cardiac:  No palpable PMI  Normal S1 and S2  Grade 2-3 systolic murmur at the base  No gallop or rub  Abdomen:  Soft and nontender  No palpable organomegaly or aortic enlargement  Extremities:  No peripheral edema  Musculoskeletal:  Symmetric  Vascular:  Femoral pulses are brisk without bruits  Popliteal pulses are intact bilaterally  Pedal pulses are intact  Neuro:  Grossly symmetric  Psych:  Alert and oriented x3  Current Outpatient Medications:   •  amLODIPine (NORVASC) 5 mg tablet, TAKE 1 TABLET BY MOUTH  DAILY, Disp: 90 tablet, Rfl: 3  •  atorvastatin (LIPITOR) 40 mg tablet, Take 1 tablet (40 mg total) by mouth daily, Disp: 90 tablet, Rfl: 3  •  SYNTHROID 125 MCG tablet, 125 mcg daily  , Disp: , Rfl:   •  potassium chloride (K-DUR) 10 mEq tablet, Take 10 mEq by mouth daily  (Patient not taking: Reported on 2022), Disp: , Rfl:   No Known Allergies  Past Medical History:   Diagnosis Date   • History of echocardiogram 2018    EF 0 65 (65%), moderate-severe aortic stenosis  Trace-mild mitral regurgitation     • Hyperlipidemia    • Hypertension    • Hypothyroid    • Nonrheumatic aortic (valve) stenosis     s/p AVR on 2018           Social History     Tobacco Use   Smoking Status Former   • Types: Cigarettes   • Quit date:    • Years since quittin 1   Smokeless Tobacco Former

## 2023-02-07 NOTE — PATIENT INSTRUCTIONS
For now stop metoprolol  I would be in favor of stopping the cholesterol pill as well but you can speak further with your primary care physician if you would like about this issue

## 2023-02-23 DIAGNOSIS — I10 HTN (HYPERTENSION), BENIGN: ICD-10-CM

## 2023-02-23 RX ORDER — AMLODIPINE BESYLATE 5 MG/1
5 TABLET ORAL DAILY
Qty: 90 TABLET | Refills: 3 | Status: SHIPPED | OUTPATIENT
Start: 2023-02-23

## 2023-03-20 ENCOUNTER — HOSPITAL ENCOUNTER (OUTPATIENT)
Dept: NON INVASIVE DIAGNOSTICS | Facility: CLINIC | Age: 78
Discharge: HOME | End: 2023-03-20

## 2023-03-20 VITALS
WEIGHT: 188 LBS | DIASTOLIC BLOOD PRESSURE: 82 MMHG | SYSTOLIC BLOOD PRESSURE: 128 MMHG | BODY MASS INDEX: 32.1 KG/M2 | HEIGHT: 64 IN | HEART RATE: 80 BPM

## 2023-03-20 DIAGNOSIS — Z95.3 S/P AORTIC VALVE REPLACEMENT WITH PORCINE VALVE: ICD-10-CM

## 2023-03-20 LAB
AORTIC ROOT: 3.1 CM
AORTIC VALVE MEAN VELOCITY: 20.9 M/S
APICAL FOUR CHAMBER EJECTION FRACTION: 66 %
ASCENDING AORTA: 3.3 CM
AV AREA BY CONTINUOUS VTI: 0.9 CM2
AV AREA PEAK VELOCITY: 0.9 CM2
AV LVOT MEAN GRADIENT: 2 MMHG
AV LVOT PEAK GRADIENT: 4 MMHG
AV MEAN GRADIENT: 20 MMHG
AV PEAK GRADIENT: 36 MMHG
AV VALVE AREA: 0.93 CM2
AV VELOCITY RATIO: 0.33
DOP CALC AO PEAK VEL: 3.2 M/S
DOP CALC AO VTI: 78.71 CM
DOP CALC LVOT AREA: 2.54 CM2
DOP CALC LVOT DIAMETER: 1.8 CM
DOP CALC LVOT PEAK VEL VTI: 28.79 CM
DOP CALC LVOT PEAK VEL: 1.04 M/S
DOP CALC LVOT STROKE INDEX: 39.8 ML/M2
DOP CALC LVOT STROKE VOLUME: 73.22
DOP CALC MV VTI: 69.96 CM
E WAVE DECELERATION TIME: 357 MS
FRACTIONAL SHORTENING: 33 (ref 28–44)
INTERVENTRICULAR SEPTUM IN DIASTOLE (PARASTERNAL SHORT AXIS VIEW): 1.2 CM
INTERVENTRICULAR SEPTUM: 1.2 CM (ref 0.6–1.1)
LAAS-AP2: 22.6 CM2
LAAS-AP4: 19.8 CM2
LEFT ATRIUM SIZE: 4.2 CM
LEFT INTERNAL DIMENSION IN SYSTOLE: 2.4 CM (ref 2.1–4)
LEFT VENTRICULAR INTERNAL DIMENSION IN DIASTOLE: 3.6 CM (ref 3.5–6)
LEFT VENTRICULAR POSTERIOR WALL IN END DIASTOLE: 1.1 CM
LEFT VENTRICULAR STROKE VOLUME: 33 ML
LVSV (TEICH): 33 ML
MV E'TISSUE VEL-SEP: 5 CM/S
MV MEAN GRADIENT: 5 MMHG
MV PEAK A VEL: 1.75 M/S
MV PEAK E VEL: 135 CM/S
MV PEAK GRADIENT: 16 MMHG
MV STENOSIS PRESSURE HALF TIME: 104 MS
MV VALVE AREA BY CONTINUITY EQUATION: 1.05 CM2
MV VALVE AREA P 1/2 METHOD: 2.12
RIGHT ATRIUM AREA SYSTOLE A4C: 10.9 CM2
RIGHT VENTRICLE ID DIMENSION: 2.4 CM
SL CV LEFT ATRIUM LENGTH A2C: 6.3 CM
SL CV LV EF: 60
SL CV PED ECHO LEFT VENTRICLE DIASTOLIC VOLUME (MOD BIPLANE) 2D: 53 ML
SL CV PED ECHO LEFT VENTRICLE SYSTOLIC VOLUME (MOD BIPLANE) 2D: 20 ML
TR MAX PG: 22 MMHG
TR PEAK VELOCITY: 2.4 M/S
TRICUSPID ANNULAR PLANE SYSTOLIC EXCURSION: 2.1 CM
TRICUSPID VALVE PEAK REGURGITATION VELOCITY: 2.35 M/S

## 2024-06-24 DIAGNOSIS — I10 HTN (HYPERTENSION), BENIGN: ICD-10-CM

## 2024-06-24 NOTE — TELEPHONE ENCOUNTER
Reason for call:   [x] Refill   [] Prior Auth  [] Other:     Office:   [] PCP/Provider -    [x] Specialty/Provider - John Cavazos MD     Medication: amLODIPine (NORVASC) 5 mg tablet     Dose/Frequency: 5 mg, Oral, Daily     Quantity: 90 tablet     Pharmacy: [facility] [place]     Does the patient have enough for 3 days?   [x] Yes   [] No - Send as HP to POD

## 2024-06-25 DIAGNOSIS — I10 ESSENTIAL HYPERTENSION: Primary | ICD-10-CM

## 2024-06-25 RX ORDER — AMLODIPINE BESYLATE 5 MG/1
5 TABLET ORAL DAILY
Qty: 90 TABLET | Refills: 0 | Status: SHIPPED | OUTPATIENT
Start: 2024-06-25

## 2024-06-25 RX ORDER — AMLODIPINE BESYLATE 5 MG/1
5 TABLET ORAL DAILY
Qty: 90 TABLET | Refills: 3 | Status: SHIPPED | OUTPATIENT
Start: 2024-06-25